# Patient Record
Sex: FEMALE | Race: WHITE | Employment: OTHER | URBAN - METROPOLITAN AREA
[De-identification: names, ages, dates, MRNs, and addresses within clinical notes are randomized per-mention and may not be internally consistent; named-entity substitution may affect disease eponyms.]

---

## 2019-02-26 ENCOUNTER — HOSPITAL ENCOUNTER (OUTPATIENT)
Facility: CLINIC | Age: 69
Discharge: HOME | End: 2019-02-26
Attending: COLON & RECTAL SURGERY | Admitting: COLON & RECTAL SURGERY
Payer: MEDICARE

## 2019-02-26 VITALS
DIASTOLIC BLOOD PRESSURE: 84 MMHG | HEART RATE: 63 BPM | OXYGEN SATURATION: 99 % | WEIGHT: 180 LBS | HEIGHT: 65 IN | BODY MASS INDEX: 29.99 KG/M2 | RESPIRATION RATE: 16 BRPM | SYSTOLIC BLOOD PRESSURE: 125 MMHG

## 2019-02-26 LAB — COLONOSCOPY: NORMAL

## 2019-02-26 PROCEDURE — 25000128 H RX IP 250 OP 636: Performed by: COLON & RECTAL SURGERY

## 2019-02-26 PROCEDURE — 88305 TISSUE EXAM BY PATHOLOGIST: CPT | Performed by: COLON & RECTAL SURGERY

## 2019-02-26 PROCEDURE — 99153 MOD SED SAME PHYS/QHP EA: CPT | Performed by: COLON & RECTAL SURGERY

## 2019-02-26 PROCEDURE — 45380 COLONOSCOPY AND BIOPSY: CPT | Mod: PT,XU | Performed by: COLON & RECTAL SURGERY

## 2019-02-26 PROCEDURE — G0500 MOD SEDAT ENDO SERVICE >5YRS: HCPCS | Performed by: COLON & RECTAL SURGERY

## 2019-02-26 PROCEDURE — 45385 COLONOSCOPY W/LESION REMOVAL: CPT | Performed by: COLON & RECTAL SURGERY

## 2019-02-26 PROCEDURE — 88305 TISSUE EXAM BY PATHOLOGIST: CPT | Mod: 26 | Performed by: COLON & RECTAL SURGERY

## 2019-02-26 RX ORDER — ONDANSETRON 2 MG/ML
4 INJECTION INTRAMUSCULAR; INTRAVENOUS
Status: DISCONTINUED | OUTPATIENT
Start: 2019-02-26 | End: 2019-02-26 | Stop reason: HOSPADM

## 2019-02-26 RX ORDER — ONDANSETRON 2 MG/ML
4 INJECTION INTRAMUSCULAR; INTRAVENOUS EVERY 6 HOURS PRN
Status: DISCONTINUED | OUTPATIENT
Start: 2019-02-26 | End: 2019-02-26 | Stop reason: HOSPADM

## 2019-02-26 RX ORDER — NALOXONE HYDROCHLORIDE 0.4 MG/ML
.1-.4 INJECTION, SOLUTION INTRAMUSCULAR; INTRAVENOUS; SUBCUTANEOUS
Status: DISCONTINUED | OUTPATIENT
Start: 2019-02-26 | End: 2019-02-26 | Stop reason: HOSPADM

## 2019-02-26 RX ORDER — FENTANYL CITRATE 50 UG/ML
INJECTION, SOLUTION INTRAMUSCULAR; INTRAVENOUS PRN
Status: DISCONTINUED | OUTPATIENT
Start: 2019-02-26 | End: 2019-02-26 | Stop reason: HOSPADM

## 2019-02-26 RX ORDER — FLUMAZENIL 0.1 MG/ML
0.2 INJECTION, SOLUTION INTRAVENOUS
Status: DISCONTINUED | OUTPATIENT
Start: 2019-02-26 | End: 2019-02-26 | Stop reason: HOSPADM

## 2019-02-26 RX ORDER — LIDOCAINE 40 MG/G
CREAM TOPICAL
Status: DISCONTINUED | OUTPATIENT
Start: 2019-02-26 | End: 2019-02-26 | Stop reason: HOSPADM

## 2019-02-26 RX ORDER — ONDANSETRON 4 MG/1
4 TABLET, ORALLY DISINTEGRATING ORAL EVERY 6 HOURS PRN
Status: DISCONTINUED | OUTPATIENT
Start: 2019-02-26 | End: 2019-02-26 | Stop reason: HOSPADM

## 2019-02-26 ASSESSMENT — MIFFLIN-ST. JEOR: SCORE: 1344.41

## 2019-02-26 NOTE — OP NOTE
See Provation Note In Chart    Shante Monterroso MD  [facility]  Office Phone # [phone]

## 2019-02-26 NOTE — H&P
Pre-Endoscopy History and Physical     Delisa Mulligan MRN# [id number]   YOB: 1950 Age: 68 year old     Date of Procedure: 2019  Primary care provider: Violeta Schumacher  Type of Endoscopy: colonoscopy  Reason for Procedure: family history  Type of Anesthesia Anticipated: Moderate Sedation    HPI:    Delisa is a 68 year old female who will be undergoing the above procedure.      A history and physical has been performed. The patient's medications and allergies have been reviewed. The risks and benefits of the procedure and the sedation options and risks were discussed with the patient.  All questions were answered and informed consent was obtained.      She denies a personal or family history of anesthesia complications or bleeding disorders.     Allergies   Allergen Reactions     Dust Mites Other (See Comments)     Sneezing and watery eyes        Prior to Admission Medications   Prescriptions Last Dose Informant Patient Reported? Taking?   AMLODIPINE BESYLATE 10 MG OR TABS 2019 at Unknown time  No Yes   Si TABLET DAILY   Alendronate Sodium (FOSAMAX PO) More than a month at Unknown time  Yes No   Sig: Take 70 mg by mouth once a week With full glass of water, do not lie down for 1 hr   NASACORT AQ 55 MCG/ACT NA AERS Past Week at Unknown time  No Yes   Sig: INHALE 2 SPRAYS IN EACH NOSTRIL ONE DAILY   Patient taking differently: inhale 1 spray in each nostril by intranasal route once daily   SIMVASTATIN 20 MG OR TABS 2019 at Unknown time  No Yes   Si TABLET AT BEDTIME   TRAMADOL HCL 50 MG OR TABS More than a month at Unknown time  Yes No   Sig: ONE TO TWO TABLETS EVERY 4 TO 6 HOURS AS NEEDED FOR PAIN   VITAMIN D, CHOLECALCIFEROL, PO Past Week at Unknown time  Yes Yes   Sig: Take 5,000 Units by mouth daily   ZYRTEC 10 MG OR TABS Past Week at Unknown time  No Yes   Sig: ONE TABLET DAILY   Zolpidem Tartrate (AMBIEN PO) 2019 at Unknown time  Yes Yes   Sig: Take 10 mg by mouth nightly as  needed for sleep   calcium-vitamin D (CALTRATE) 600-400 MG-UNIT per tablet Past Week at Unknown time  Yes Yes   Sig: Take 1 tablet by mouth 2 times daily   ciprofloxacin (CIPRO) 500 MG tablet More than a month at Unknown time  No No   Sig: Take 1 tablet (500 mg) by mouth 2 times daily   docusate sodium (COLACE) 100 MG capsule More than a month at Unknown time  No No   Sig: Take 1 capsule (100 mg) by mouth 2 times daily   ferrous gluconate (FERGON) 324 (38 FE) MG tablet More than a month at Unknown time  No No   Sig: Take 1 tablet (324 mg) by mouth daily   hydrocortisone 1 % lotion Past Week at Unknown time  Yes Yes   Sig: Apply topically 2 times daily Apply topically to affected area daily for itching   loteprednol (LOTEMAX) 0.5 % ophthalmic suspension Past Week at Unknown time  Yes Yes   Sig: Place 1 drop Into the left eye 4 times daily   oxyCODONE (OXYCONTIN) 20 MG 12 hr tablet More than a month at Unknown time  No No   Sig: Take 1 tablet (20 mg) by mouth every 12 hours   oxyCODONE (ROXICODONE) 5 MG immediate release tablet More than a month at Unknown time  No No   Sig: Take 1-2 tablets (5-10 mg) by mouth every 3 hours as needed for moderate to severe pain      Facility-Administered Medications: None       Patient Active Problem List   Diagnosis     Allergic state     Essential hypertension     Arthropathy     Pure hypercholesterolemia     S/P total knee replacement using cement     S/P total hip arthroplasty        Past Medical History:   Diagnosis Date     Acne rosacea      Allergic rhinitis      ALLERGY, UNSPECIFIED 12/11/2007     Arthritis     DJD KNEES     ARTHROPATHY NOS-UNSPEC 12/11/2007     Hemorrhoids, internal      HYPERTENSION NOS 12/11/2007     Hypertriglyceridaemia      PURE HYPERCHOLESTEROLEM 12/11/2007     Senile osteoporosis         Past Surgical History:   Procedure Laterality Date     ARTHROPLASTY HIP  1/07    Left     ARTHROPLASTY HIP  1/08    Right     ARTHROPLASTY KNEE  5/5/2014    Procedure:  "ARTHROPLASTY KNEE;  Surgeon: Silviano Botello MD;  Location: [facility] OR     ARTHROPLASTY KNEE Left 10/21/2014    Procedure: ARTHROPLASTY KNEE;  Surgeon: Silviano Botello MD;  Location: [facility] OR     BACK SURGERY  2002    lumbar spinal fusion     COLONOSCOPY  2012    Dr. Stout     COLONOSCOPY  02/26/2019    Dr. Monterroso [facility]     HERNIA REPAIR, INGUINAL RT/LT  1961    rt-as a child     INJECT STEROID (LOCATION)  5/5/2014    Procedure: INJECT STEROID (LOCATION);  Surgeon: Silviano Botello MD;  Location: [facility] OR     SURGICAL HISTORY OF -       shoulder surgery     SURGICAL HISTORY OF -   2002    back surgery     TONSILLECTOMY  1959       Social History     Tobacco Use     Smoking status: Never Smoker     Smokeless tobacco: Never Used   Substance Use Topics     Alcohol use: Yes     Comment: occ       Family History   Problem Relation Age of Onset     Colon Cancer Mother        REVIEW OF SYSTEMS:     5 point ROS negative except as noted above in HPI, including Gen., Resp., CV, GI &  system review.      PHYSICAL EXAM:   /82   Resp 16   Ht 1.638 m (5' 4.5\")   Wt 81.6 kg (180 lb)   SpO2 99%   BMI 30.42 kg/m   Estimated body mass index is 30.42 kg/m  as calculated from the following:    Height as of this encounter: 1.638 m (5' 4.5\").    Weight as of this encounter: 81.6 kg (180 lb).   GENERAL APPEARANCE: healthy and alert  MENTAL STATUS: alert  AIRWAY EXAM: Mallampatti Class II (visualization of the soft palate, fauces, and uvula)  RESP: lungs clear to auscultation - no rales, rhonchi or wheezes  CV: regular rates and rhythm      DIAGNOSTICS:    Not indicated      IMPRESSION   ASA Class 2 - Mild systemic disease        PLAN:       Plan for colonoscopy. We discussed the risks, benefits and alternatives and the patient wished to proceed.    The above has been forwarded to the consulting provider.      Signed Electronically by: Shante Monterroso MD  February 26, 2019    "

## 2019-02-26 NOTE — DISCHARGE INSTRUCTIONS
The patient has received a copy of the Provation  report the doctor has written and discharge instructions have been discussed with the patient and responsible adult.  All questions were addressed and answered prior to patient discharge.      Understanding Colon and Rectal Polyps     The colon has a smooth lining composed of millions of cells.     The colon (also called the large intestine) is a muscular tube that forms the last part of the digestive tract. It absorbs water and stores food waste. The colon is about 4 to 6 feet long. The rectum is the last 6 inches of the colon. The colon and rectum have a smooth lining composed of millions of cells. Changes in these cells can lead to growths in the colon that can become cancerous and should be removed.     When the Colon Lining Changes  Changes that occur in the cells that line the colon or rectum can lead to growths called polyps. Over a period of years, polyps can turn cancerous. Removing polyps early may prevent cancer from ever forming.      Polyps  Polyps are fleshy clumps of tissue that form on the lining of the colon or rectum. Small polyps are usually benign (not cancerous). However, over time, cells in a polyp can change and become cancerous. The larger a polyp grows, the more likely this is to happen. Also, certain types of polyps known as adenomatous polyps are considered premalignant. This means that they will almost always become cancerous if they re not removed.          Cancer  Almost all colorectal cancers start when polyp cells begin growing abnormally. As a cancerous tumor grows, it may involve more and more of the colon or rectum. In time, cancer can also grow beyond the colon or rectum and spread to nearby organs or to glands called lymph nodes. The cells can also travel to other parts of the body. This is known as metastasis. The earlier a cancerous tumor is removed, the better the chance of preventing its spread.        9562-8351 Kellie  [facility], [address] [place] [address]. All rights reserved. This information is not intended as a substitute for professional medical care. Always follow your healthcare professional's instructions.    DIVERTICULOSIS  You have diverticulosis. This means that small pouches have formed in the wall of the colon (large intestine). Most often, this problem causes no symptoms. But the pouches in the colon are at risk for becoming infected or inflamed. When this happens, the condition is called diverticulitis.  The doctor will talk with you about how to manage your condition. Diet changes may be all that are needed to help control diverticulosis and prevent progression to diverticulitis. If you develop diverticulitis, other treatments will likely be needed.  HOME CARE  Medications: You may be told to take fiber supplements daily. Fiber adds bulk to the stool so that it passes through the colon more easily. Stool softeners may be recommended. You may also be given medications for pain relief. Be sure to take all medications as directed.  General Care:    Eat unprocessed foods that are high in fiber. Whole grains, fruits, and vegetables are good choices.    Drink 6 to 8 glasses of water daily.    Watch for changes in your bowel movements. Tell the doctor if you notice any changes.    Begin an exercise program. Ask your doctor how to get started.    Get plenty of rest and sleep.  FOLLOW UP as advised by the doctor or our staff. Regular visits may be needed to check on your health. Be sure to keep all your appointments.  GET PROMPT MEDICAL ATTENTION if any of the following occurs:    Fever of 100.6 F (38 C) or higher, or as directed by your healthcare provider    Severe cramps in the lower left side of the abdomen or pain that is getting progressively worse.    Tenderness in the lower left side of the abdomen or worsening pain throughout the abdomen    Diarrhea or constipation that does not improve within 24  hours    Nausea and vomiting    Bleeding from the rectum      [id number] [facility], [address], [place] [address]. All rights reserved. This information is not intended as a substitute for professional medical care. Always follow your healthcare professional's instructions.    Eating a High-Fiber Diet  Fiber is what gives strength and structure to plants. Most grains, beans, vegetables, and fruits contain fiber. Foods rich in fiber are often low in calories and fat, and they fill you up more. They may also reduce your risks for certain health problems. To find out the amount of fiber in canned, packaged, or frozen foods, read the  Nutrition Facts  label. It tells you how much fiber is in a serving.      Types of Fiber and Their Benefits  There are two types of fiber: insoluble and soluble. They both aid digestion and help you maintain a healthy weight.  Insoluble fiber: This is found in whole grains, cereals, certain fruits and vegetables (such as apple skin, corn, and carrots). Insoluble fiber may prevent constipation and reduce the risk of certain types of cancer.   Soluble fiber: This type of fiber is in oats, beans, and certain fruits and vegetables (such as strawberries and peas). Soluble fiber can reduce cholesterol (which may help lower the risk of heart disease), and helps control blood sugar levels.  Look for High-Fiber Foods  Whole-grain breads and cereals: Try to eat 6-8 ounces a day. Include wheat and oat bran cereals, whole-wheat muffins or toast, and corn tortillas in your meals.  Fruits: Try to eat 2 cups a day. Apples, oranges, strawberries, pears, and bananas are good sources. (Note: Fruit juice is low in fiber.)  Vegetables: Try to eat 3 cups a day. Add asparagus, carrots, broccoli, peas, and corn to your meals.  Legumes (beans): One cup of cooked lentils gives you over 15 grams of fiber. Try navy beans, lentils, and chickpeas.  Seeds:  A small handful of seeds gives you about 3  grams of fiber. Try sunflower seeds.    Keep Track of Your Fiber  A healthy diet includes 31 grams of fiber a day if you have a 2,000-calorie diet. Keep track of how much fiber you eat. Start by reading food labels. Then eat a variety of foods high in fiber. Ask your doctor about supplemental fiber products.            [id number] Kell[employer] Stauffer, [address] [place] [address], [place] [address]. All rights reserved. This information is not intended as a substitute for professional medical care. Always follow your healthcare professional's instructions.

## 2019-02-27 LAB — COPATH REPORT: NORMAL

## 2019-11-03 ENCOUNTER — HEALTH MAINTENANCE LETTER (OUTPATIENT)
Age: 69
End: 2019-11-03

## 2020-02-10 ENCOUNTER — HEALTH MAINTENANCE LETTER (OUTPATIENT)
Age: 70
End: 2020-02-10

## 2020-11-16 ENCOUNTER — HEALTH MAINTENANCE LETTER (OUTPATIENT)
Age: 70
End: 2020-11-16

## 2021-04-03 ENCOUNTER — HEALTH MAINTENANCE LETTER (OUTPATIENT)
Age: 71
End: 2021-04-03

## 2021-05-27 ENCOUNTER — APPOINTMENT (OUTPATIENT)
Dept: RADIOLOGY | Facility: HOSPITAL | Age: 71
End: 2021-05-27
Payer: MEDICARE

## 2021-05-27 ENCOUNTER — HOSPITAL ENCOUNTER (EMERGENCY)
Facility: HOSPITAL | Age: 71
Discharge: HOME | End: 2021-05-27
Attending: PHYSICIAN ASSISTANT | Admitting: PHYSICIAN ASSISTANT
Payer: MEDICARE

## 2021-05-27 VITALS
HEIGHT: 63 IN | SYSTOLIC BLOOD PRESSURE: 136 MMHG | DIASTOLIC BLOOD PRESSURE: 78 MMHG | WEIGHT: 170 LBS | HEART RATE: 60 BPM | BODY MASS INDEX: 30.12 KG/M2 | OXYGEN SATURATION: 94 % | RESPIRATION RATE: 17 BRPM | TEMPERATURE: 99 F

## 2021-05-27 DIAGNOSIS — I48.91 ATRIAL FIBRILLATION (CMS/HCC): Primary | ICD-10-CM

## 2021-05-27 LAB
ALBUMIN SERPL-MCNC: 4.5 G/DL (ref 3.5–5.3)
ALP SERPL-CCNC: 73 U/L (ref 55–142)
ALT SERPL-CCNC: 28 U/L (ref 7–52)
ANION GAP SERPL CALC-SCNC: 12 MMOL/L (ref 3–11)
APTT PPP: 33.3 SECONDS (ref 25.1–36.5)
AST SERPL-CCNC: 29 U/L
BASE EXCESS BLDV CALC-SCNC: 3 MMOL/L (ref -2–2)
BASOPHILS # BLD AUTO: 0 10*3/UL
BASOPHILS NFR BLD AUTO: 1 % (ref 0–2)
BILIRUB SERPL-MCNC: 1.09 MG/DL (ref 0.2–1.4)
BNP SERPL-MCNC: 51 PG/ML (ref 0–100)
BUN SERPL-MCNC: 13 MG/DL (ref 7–25)
CALCIUM ALBUM COR SERPL-MCNC: 9.1 MG/DL (ref 8.6–10.3)
CALCIUM SERPL-MCNC: 9.5 MG/DL (ref 8.6–10.3)
CHLORIDE SERPL-SCNC: 101 MMOL/L (ref 98–107)
CO2 BLDV-SCNC: 26.2 MMOL/L (ref 19–24)
CO2 SERPL-SCNC: 25 MMOL/L (ref 21–32)
CREAT SERPL-MCNC: 0.77 MG/DL (ref 0.6–1.1)
CRP SERPL-MCNC: 3.1 MG/L
DELTA HIGH SENSITIVITY TROPONIN I, 1 HOUR: -0.2
EOSINOPHIL # BLD AUTO: 0.1 10*3/UL
EOSINOPHIL NFR BLD AUTO: 1 % (ref 0–3)
ERYTHROCYTE [DISTWIDTH] IN BLOOD BY AUTOMATED COUNT: 14.7 % (ref 11.5–14)
FIBRIN D-DIMER (NG/ML) IN PLATELET POOR PLASMA: 664 NG/ML FEU
GFR SERPL CREATININE-BSD FRML MDRD: 79 ML/MIN/1.73M*2
GLUCOSE SERPL-MCNC: 132 MG/DL (ref 70–105)
HCO3 BLDV-SCNC: 25.2 MMOL/L (ref 23–29)
HCT VFR BLD AUTO: 43.1 % (ref 34–45)
HGB BLD-MCNC: 14.9 G/DL (ref 11.5–15.5)
INR BLD: 1
LYMPHOCYTES # BLD AUTO: 0.6 10*3/UL
LYMPHOCYTES NFR BLD AUTO: 9 % (ref 11–47)
MAGNESIUM SERPL-MCNC: 1.7 MG/DL (ref 1.8–2.4)
MCH RBC QN AUTO: 29.3 PG (ref 28–33)
MCHC RBC AUTO-ENTMCNC: 34.5 G/DL (ref 32–36)
MCV RBC AUTO: 85.1 FL (ref 81–97)
MONOCYTES # BLD AUTO: 0.3 10*3/UL
MONOCYTES NFR BLD AUTO: 5 % (ref 3–11)
NEUTROPHILS # BLD AUTO: 5.7 10*3/UL
NEUTROPHILS NFR BLD AUTO: 85 % (ref 41–81)
PCO2 BLDA: ABNORMAL MM[HG]
PCO2 BLDV: 31.5 MMHG (ref 39–51)
PH BLDA: ABNORMAL [PH]
PH BLDV: 7.51 PH (ref 7.35–7.45)
PLATELET # BLD AUTO: 262 10*3/UL (ref 140–350)
PMV BLD AUTO: 7.5 FL (ref 6.9–10.8)
PO2 BLDV: 36.6 MMHG (ref 30–50)
POCT PO2 (T), VENOUS: ABNORMAL
POCT TEMPERATURE, VENOUS: ABNORMAL
POTASSIUM SERPL-SCNC: 3.5 MMOL/L (ref 3.5–5.1)
PROT SERPL-MCNC: 7 G/DL (ref 6–8.3)
PROTHROMBIN TIME: 11.9 SECONDS (ref 9.4–12.5)
RBC # BLD AUTO: 5.07 10*6/ΜL (ref 3.7–5.3)
SAO2 % BLDV: 77 % (ref 40–70)
SODIUM SERPL-SCNC: 138 MMOL/L (ref 135–145)
TROPONIN I SERPL-MCNC: 3.6 PG/ML
TROPONIN I SERPL-MCNC: 3.8 PG/ML
TSH SERPL DL<=0.05 MIU/L-ACNC: 1.87 UIU/ML (ref 0.34–4.82)
WBC # BLD AUTO: 6.7 10*3/UL (ref 4.5–10.5)

## 2021-05-27 PROCEDURE — 85610 PROTHROMBIN TIME: CPT | Performed by: PHYSICIAN ASSISTANT

## 2021-05-27 PROCEDURE — 84443 ASSAY THYROID STIM HORMONE: CPT | Performed by: PHYSICIAN ASSISTANT

## 2021-05-27 PROCEDURE — 82803 BLOOD GASES ANY COMBINATION: CPT | Performed by: PHYSICIAN ASSISTANT

## 2021-05-27 PROCEDURE — 71045 X-RAY EXAM CHEST 1 VIEW: CPT

## 2021-05-27 PROCEDURE — 6370000100 HC RX 637 (ALT 250 FOR IP): Performed by: PHYSICIAN ASSISTANT

## 2021-05-27 PROCEDURE — 85379 FIBRIN DEGRADATION QUANT: CPT | Performed by: PHYSICIAN ASSISTANT

## 2021-05-27 PROCEDURE — 96366 THER/PROPH/DIAG IV INF ADDON: CPT

## 2021-05-27 PROCEDURE — 96365 THER/PROPH/DIAG IV INF INIT: CPT

## 2021-05-27 PROCEDURE — 6360000200 HC RX 636 W HCPCS (ALT 250 FOR IP): Performed by: PHYSICIAN ASSISTANT

## 2021-05-27 PROCEDURE — 83735 ASSAY OF MAGNESIUM: CPT | Performed by: PHYSICIAN ASSISTANT

## 2021-05-27 PROCEDURE — 85025 COMPLETE CBC W/AUTO DIFF WBC: CPT | Performed by: PHYSICIAN ASSISTANT

## 2021-05-27 PROCEDURE — 84484 ASSAY OF TROPONIN QUANT: CPT | Performed by: PHYSICIAN ASSISTANT

## 2021-05-27 PROCEDURE — 36415 COLL VENOUS BLD VENIPUNCTURE: CPT | Performed by: PHYSICIAN ASSISTANT

## 2021-05-27 PROCEDURE — 93005 ELECTROCARDIOGRAM TRACING: CPT | Performed by: PHYSICIAN ASSISTANT

## 2021-05-27 PROCEDURE — 80053 COMPREHEN METABOLIC PANEL: CPT | Performed by: PHYSICIAN ASSISTANT

## 2021-05-27 PROCEDURE — 86140 C-REACTIVE PROTEIN: CPT | Performed by: PHYSICIAN ASSISTANT

## 2021-05-27 PROCEDURE — 99284 EMERGENCY DEPT VISIT MOD MDM: CPT

## 2021-05-27 PROCEDURE — 93010 ELECTROCARDIOGRAM REPORT: CPT | Performed by: INTERNAL MEDICINE

## 2021-05-27 PROCEDURE — 85730 THROMBOPLASTIN TIME PARTIAL: CPT | Performed by: PHYSICIAN ASSISTANT

## 2021-05-27 PROCEDURE — 83880 ASSAY OF NATRIURETIC PEPTIDE: CPT | Performed by: PHYSICIAN ASSISTANT

## 2021-05-27 PROCEDURE — 99284 EMERGENCY DEPT VISIT MOD MDM: CPT | Performed by: PHYSICIAN ASSISTANT

## 2021-05-27 RX ORDER — METOPROLOL SUCCINATE 25 MG/1
TABLET, EXTENDED RELEASE ORAL
Status: DISCONTINUED
Start: 2021-05-27 | End: 2021-05-27 | Stop reason: HOSPADM

## 2021-05-27 RX ORDER — METOPROLOL SUCCINATE 25 MG/1
25 TABLET, EXTENDED RELEASE ORAL ONCE
Status: COMPLETED | OUTPATIENT
Start: 2021-05-27 | End: 2021-05-27

## 2021-05-27 RX ORDER — ZOLPIDEM TARTRATE 5 MG/1
5 TABLET ORAL NIGHTLY PRN
COMMUNITY

## 2021-05-27 RX ORDER — CHOLECALCIFEROL (VITAMIN D3) 25 MCG
6000 TABLET ORAL DAILY
COMMUNITY

## 2021-05-27 RX ORDER — SIMVASTATIN 20 MG/1
20 TABLET, FILM COATED ORAL NIGHTLY
COMMUNITY

## 2021-05-27 RX ORDER — AMLODIPINE BESYLATE 10 MG/1
10 TABLET ORAL DAILY
COMMUNITY

## 2021-05-27 RX ORDER — DULOXETIN HYDROCHLORIDE 30 MG/1
40 CAPSULE, DELAYED RELEASE ORAL DAILY
COMMUNITY

## 2021-05-27 RX ORDER — METOPROLOL SUCCINATE 25 MG/1
25 TABLET, EXTENDED RELEASE ORAL DAILY
Qty: 30 TABLET | Refills: 0 | Status: SHIPPED | OUTPATIENT
Start: 2021-05-27 | End: 2021-06-26

## 2021-05-27 RX ORDER — MULTIVITAMIN
1 TABLET ORAL DAILY
COMMUNITY

## 2021-05-27 RX ORDER — MAGNESIUM SULFATE HEPTAHYDRATE 40 MG/ML
2 INJECTION, SOLUTION INTRAVENOUS ONCE
Status: COMPLETED | OUTPATIENT
Start: 2021-05-27 | End: 2021-05-27

## 2021-05-27 RX ORDER — NAPROXEN SODIUM 220 MG/1
162 TABLET, FILM COATED ORAL ONCE
Status: COMPLETED | OUTPATIENT
Start: 2021-05-27 | End: 2021-05-27

## 2021-05-27 RX ADMIN — METOPROLOL SUCCINATE 25 MG: 25 TABLET, EXTENDED RELEASE ORAL at 19:46

## 2021-05-27 RX ADMIN — NAPROXEN SODIUM 162 MG: 220 TABLET, FILM COATED ORAL at 19:05

## 2021-05-27 RX ADMIN — MAGNESIUM SULFATE HEPTAHYDRATE 2 G: 40 INJECTION, SOLUTION INTRAVENOUS at 19:37

## 2021-05-27 RX ADMIN — METOPROLOL SUCCINATE 25 MG: 25 TABLET, EXTENDED RELEASE ORAL at 19:45

## 2021-05-27 ASSESSMENT — ENCOUNTER SYMPTOMS
CONFUSION: 0
HEADACHES: 0
DIAPHORESIS: 0
NAUSEA: 1
STRIDOR: 0
BLOOD IN STOOL: 0
CHILLS: 1
MYALGIAS: 0
DECREASED CONCENTRATION: 0
PHOTOPHOBIA: 0
CONSTIPATION: 0
PALPITATIONS: 0
JOINT SWELLING: 0
CHEST TIGHTNESS: 0
VOMITING: 1
EYE PAIN: 0
NUMBNESS: 0
FEVER: 0
SPEECH DIFFICULTY: 0
SORE THROAT: 0
NECK STIFFNESS: 0
SHORTNESS OF BREATH: 1
BACK PAIN: 0
LIGHT-HEADEDNESS: 1
SINUS PRESSURE: 0
RHINORRHEA: 0
EYE DISCHARGE: 0
ARTHRALGIAS: 0
ABDOMINAL DISTENTION: 0
COLOR CHANGE: 0
DYSURIA: 0
FACIAL SWELLING: 0
COUGH: 1
ABDOMINAL PAIN: 1
SINUS PAIN: 0
TROUBLE SWALLOWING: 0
FLANK PAIN: 0
NERVOUS/ANXIOUS: 0
HALLUCINATIONS: 0
NECK PAIN: 0
TREMORS: 0
SEIZURES: 0
HEMATURIA: 0
AGITATION: 0
WOUND: 0
APNEA: 0
FATIGUE: 0
CHOKING: 0
FACIAL ASYMMETRY: 0
DIZZINESS: 0
WEAKNESS: 0
SLEEP DISTURBANCE: 0
DIARRHEA: 0
WHEEZING: 0

## 2021-05-27 ASSESSMENT — PAIN DESCRIPTION - DESCRIPTORS: DESCRIPTORS: SHARP;ACHING

## 2021-05-27 NOTE — ED NOTES
C/O chest pain on and off for a couple of month but getting worse, has worn the halter monitor. Started last night and thought she should be checked out. Reports she coughed all night. Pain is in left chest rates about a 5/10 at this time. Also c/O pain to zyphoid area.     Shahid Lopez RN  05/27/21 [id number]

## 2021-05-27 NOTE — ED PROVIDER NOTES
"    HPI:  Chief Complaint   Patient presents with   • Chest Pain       Patient is a 70 year old female presenting to the emergency room via private vehicle with complaints of chest pain. She reports intermittent chest pain for the past 2 months but states that the pain she feels today started last night. She describes it as a sharp sensation that has gradually been worsening. She rates her pain 5/10 in severity today and points to her sternum when describing the location. Patient denies any exacerbating or alleviating factors to her chest pain and states that it \"comes and goes\". She complains of associated shortness of breath with exertion, intermittent light headedness, nausea, vomiting 3 times today, epigastric abdominal pain, chills, and vision changes. She states that while she was driving here from [place] 3 days ago, her peripheral vision in her left eye became blurry but returned to normal after roughly 15 minutes. She denies any appetite changes, headache, speech difficulty, weakness, dizziness, fever, diarrhea, or diaphoresis. Patient denies any recent sick contacts and states that she has been vaccinated against COVID-19.     Patient is visiting from [place] with her . She reports that she had blood work, an EKG, chest x-ray and heart monitor for 2 weeks all completed within the last month due to her intermittent chest pain. She has not received these results yet. Patient reports a history of hypertension for which she takes Amlodipine 10 mg. She also takes Simvastatin 20 mg for hyperlipidemia. She reports that her mother and 2 of her brothers have histories of atrial fibrillation and have had previous myocardial infarctions.     History and examination completed by JORGE VernonS2 supervised by Nicki Irizarry PA-C.           HISTORY:  Past Medical History:   Diagnosis Date   • Hypercholesteremia    • Hypertension        Past Surgical History:   Procedure Laterality Date   • BACK SURGERY "     • JOINT REPLACEMENT         History reviewed. No pertinent family history.    Social History     Tobacco Use   • Smoking status: Never Smoker   • Smokeless tobacco: Never Used   Vaping Use   • Vaping Use: Never used   Substance Use Topics   • Alcohol use: Yes   • Drug use: Never         ROS:  Review of Systems   Constitutional: Positive for chills. Negative for diaphoresis, fatigue and fever.   HENT: Negative for congestion, dental problem, drooling, ear pain, facial swelling, nosebleeds, rhinorrhea, sinus pressure, sinus pain, sneezing, sore throat, tinnitus and trouble swallowing.    Eyes: Positive for visual disturbance (peripheral vision of left eye). Negative for photophobia, pain and discharge.   Respiratory: Positive for cough and shortness of breath. Negative for apnea, choking, chest tightness, wheezing and stridor.    Cardiovascular: Positive for chest pain. Negative for palpitations and leg swelling.   Gastrointestinal: Positive for abdominal pain, nausea and vomiting. Negative for abdominal distention, blood in stool, constipation and diarrhea.   Genitourinary: Negative for dysuria, flank pain, hematuria and urgency.   Musculoskeletal: Negative for arthralgias, back pain, gait problem, joint swelling, myalgias, neck pain and neck stiffness.   Skin: Negative for color change, pallor, rash and wound.   Neurological: Positive for light-headedness. Negative for dizziness, tremors, seizures, syncope, facial asymmetry, speech difficulty, weakness, numbness and headaches.   Psychiatric/Behavioral: Negative for agitation, confusion, decreased concentration, hallucinations and sleep disturbance. The patient is not nervous/anxious.        PE:  ED Triage Vitals   Temp Heart Rate Resp BP SpO2   05/27/21 1810 05/27/21 1810 05/27/21 1810 05/27/21 1810 05/27/21 1810   37.7 °C (99.8 °F) 74 18 102/87 93 %      Temp Source Heart Rate Source Patient Position BP Location FiO2 (%)   05/27/21 1810 -- 05/27/21 1845 -- --    Oral  Head of bed 30 degrees or higher         Physical Exam  Vitals and nursing note reviewed.   Constitutional:       General: She is not in acute distress.     Appearance: She is well-developed.   HENT:      Head: Normocephalic and atraumatic.      Right Ear: Tympanic membrane normal.      Left Ear: Tympanic membrane normal.      Nose: Nose normal. No congestion or rhinorrhea.      Mouth/Throat:      Mouth: Mucous membranes are moist.      Pharynx: Oropharynx is clear. No oropharyngeal exudate or posterior oropharyngeal erythema.   Eyes:      General: No scleral icterus.     Extraocular Movements: Extraocular movements intact.      Conjunctiva/sclera: Conjunctivae normal.      Pupils: Pupils are equal, round, and reactive to light.   Neck:      Vascular: No JVD.   Cardiovascular:      Rate and Rhythm: Tachycardia present. Rhythm irregular.      Pulses: Normal pulses.      Heart sounds: Normal heart sounds. No murmur heard.     Pulmonary:      Effort: Pulmonary effort is normal. No respiratory distress.      Breath sounds: Normal breath sounds. No stridor. No wheezing or rhonchi.   Chest:      Chest wall: No tenderness.   Abdominal:      General: Abdomen is flat. Bowel sounds are normal. There is no distension.      Palpations: Abdomen is soft. There is no mass.      Tenderness: There is abdominal tenderness (epigastric). There is no right CVA tenderness, left CVA tenderness, guarding or rebound.      Hernia: No hernia is present.   Musculoskeletal:         General: No swelling, tenderness, deformity or signs of injury. Normal range of motion.      Cervical back: Normal range of motion and neck supple.      Right lower leg: No edema.      Left lower leg: No edema.   Lymphadenopathy:      Cervical: No cervical adenopathy.   Skin:     General: Skin is warm and dry.      Capillary Refill: Capillary refill takes less than 2 seconds.      Coloration: Skin is not jaundiced.      Findings: No erythema.   Neurological:  "     General: No focal deficit present.      Mental Status: She is alert and oriented to person, place, and time.      Cranial Nerves: No cranial nerve deficit.      Sensory: No sensory deficit.      Motor: No weakness.      Coordination: Coordination normal.      Gait: Gait normal.   Psychiatric:         Mood and Affect: Mood normal.         Behavior: Behavior normal.         Thought Content: Thought content normal.         Judgment: Judgment normal.         ED LABS:  Labs Reviewed   COMPREHENSIVE METABOLIC PANEL - Abnormal       Result Value    Sodium 138      Potassium 3.5      Chloride 101      CO2 25      Anion Gap 12 (*)     BUN 13      Creatinine 0.77      Glucose 132 (*)     Calcium 9.5      AST 29      ALT (SGPT) 28      Alkaline Phosphatase 73      Total Protein 7.0      Albumin 4.5      Total Bilirubin 1.09      eGFR 79      Corrected Calcium 9.1      Narrative:     Estimated GFR calculated using the 2009 CKD-EPI creatinine equation.   D-DIMER, QUANTITATIVE - Abnormal    D-Dimer, Quant (ng/mL) 664 (*)     Narrative:     D-dimer values increase with age and this can make VTE exclusion of an older population difficult. To address this, The [employer], based on best available evidence and recent guidelines, recommends that clinicians use age-adjusted D-dimer thresholds in patients greater than 50 years of age with: a) a low probability of PE who do not meet all Pulmonary Embolism Rule Out Criteria, or b) in those with intermediate probability of PE. The formula for an age-adjusted D-dimer cut-off is \"ageX10 ng/mL\". For example, a 60 year old patient would have an age-adjusted cut-off of 600 ng/mL FEU and an 80 year old would be 800 ng/mL FEU.  D-dimer values < or =500 ng/mL FEU may be used in conjunction with clinical pretest probability to exclude deep vein thrombosis (DVT) and pulmonary embolism (PE).   CBC WITH AUTO DIFFERENTIAL - Abnormal    WBC 6.7      RBC 5.07      Hemoglobin 14.9  "     Hematocrit 43.1      MCV 85.1      MCH 29.3      MCHC 34.5      RDW 14.7 (*)     Platelets 262      MPV 7.5      Neutrophils% 85 (*)     Lymphocytes% 9 (*)     Monocytes% 5      Eosinophils% 1      Basophils% 1      ANC (auto diff) 5.70      Lymphocytes Absolute 0.60      Monocytes Absolute 0.30      Eosinophils Absolute 0.10      Basophils Absolute 0.00     MAGNESIUM - Abnormal    Magnesium 1.7 (*)    POCT VENOUS BLOOD GAS - Abnormal    pH, Venous 7.51 (*)     pCO2, Venous 31.5 (*)     pO2, Venous 36.6      pH (T), Venous        pCO2 (T), Venous        pO2 (T), Venous        HCO3, Venous 25.2      Base Excess, Venous 3.0 (*)     O2 Sat, Venous 77 (*)     tCO2 (calculated), Venous 26.2 (*)     Patient Temperature       PROTIME-INR - Normal    Protime 11.9      INR 1.0     PTT (ACTIVATED PARTIAL THROMBOPLASTIN TIME) - Normal    PTT 33.3     C-REACTIVE PROTEIN - Normal    CRP 3.1     TSH - Normal    TSH 1.872     B-TYPE NATRIURETIC PEPTIDE (BNP) - Normal    BNP 51     HIGH SENSITIVE TROPONIN I - Normal    hsTnI 0 Hour 3.8     HIGH SENSITIVE TROPONIN I, 1 HOUR - Normal    hsTnI 1 hr 3.6      Delta from 0 Hour -0.2     POCT BLOOD GAS    Narrative:     The following orders were created for panel order POCT Blood Gas Blood, Venous.  Procedure                               Abnormality         Status                     ---------                               -----------         ------                     POCT Venous blood gas Blo...[[id number]]  Abnormal            Final result                 Please view results for these tests on the individual orders.   HIGH SENSITIVE TROPONIN I PANEL (0HR, 1HR, 2HR)    Narrative:     The following orders were created for panel order HS Troponin I Panel (0HR, 1HR, 2HR) Blood, Venous.  Procedure                               Abnormality         Status                     ---------                               -----------         ------                     HS Troponin I[[id number]]                  Normal              Final result               1HR High Sensitive Trop I[[id number]]     Normal              Final result               2HR High Sensitive Tropon...[[id number]]                                                   Please view results for these tests on the individual orders.   URINALYSIS WITH MICROSCOPIC, REFLEX CULTURE    Narrative:     The following orders were created for panel order Urinalysis w/microscopic, reflex culture Urine, Clean Catch.  Procedure                               Abnormality         Status                     ---------                               -----------         ------                     Urinalysis, Dip (part 1 o...[[id number]]                                                 Urinalysis, Micro (part 2...[[id number]]                                                   Please view results for these tests on the individual orders.   URINALYSIS DIPSTICK REFLEX TO CULTURE FOR USE WITH MICROSCOPIC PANEL   URINALYSIS MICROSCOPIC, REFLEX CULTURE   HIGH SENSITIVE TROPONIN I, 2 HOUR         ED IMAGES:  XR chest portable 1 view   Final Result   IMPRESSION:   No acute cardiopulmonary abnormality.          ED PROCEDURES:  Procedures    ED COURSE:  ED Course as of May 27 2143   Thu May 27, 2021   1831 While hooking the patient up to the cardiac monitor, it was noted that she was in an atrial fibrillation with RVR. The patient was talking and gestured with her arms in the air when she spontaneously converted back to a normal sinus rhythm.     [CC]   1832 pH, Venous(!): 7.51 [CC]   1832 pCO2, Venous(!): 31.5 [CC]   1832 Base Excess, Venous(!): 3.0 [CC]   1832 O2 Sat, Venous(!): 77 [CC]   1832 tCO2 (calculated), Venous(!): 26.2 [CC]   1832 RDW(!): 14.7 [CC]   1832 Neutrophils%(!): 85 [CC]   1832 Lymphocytes%(!): 9 [CC]   1842 Anion Gap(!): 12 [CC]   1842 Glucose(!): 132 [CC]   1842 Magnesium(!): 1.7 [CC]   1852 hsTnI 0 Hour: 3.8 [CC]   1852 D-Dimer, Quant (ng/mL)(!): 664 [CC]   1934 Spoke  with on call physician Dr. Pollard at 1929 who recommended administration of magnesium 2 g IV while in the emergency room and to start the patient on Metoprolol succinate 25 mg first dose to be given here in the emergency room for rate control. He also recommended starting OTC magnesium oxide 400 mg and Aspirin 81 mg daily.     [CC]   3057 Attached Instructions    Atrial Fibrillation Easy-to-Read (English)    DISCHARGE INSTRUCTIONS:  While in the emergency room, your heart rhythm was in atrial fibrillation. You spontaneously converted back to a normal sinus rhythm. Rhythm strips showed the atrial fibrillation prior to conversion but EKG showed sinus rhythm. Chest x-ray was normal. Labs were unremarkable except for a slightly low magnesium level.     You were started on Metoprolol 25 mg once daily to keep your heart in a normal rhythm and prevent reoccurrence of atrial fibrillation. You were given one here in the emergency room along with another tablet to take tomorrow. Take your prescription to the nearest pharmacy tomorrow.     Take aspirin 81 mg once daily, an anticoagulant to help lower your risk for clots or a stroke as atrial fibrillation can put you at an increased risk for clots.     Take over-the-counter Magnesium oxide 400 mg daily because low magnesium levels can cause irregular heart rhythms.     Follow up with your primary care provider when you return to [place]. Inform them of this emergency room visit and atrial fibrillation. If your chest pain returns or any of your symptoms worsen, please call 911 or go to the nearest emergency department.     [CC]      ED Course User Index  [CC] Nicki Irizarry PA-C          Sepsis Quality Bundle     MDM:  MDM  Number of Diagnoses or Management Options  Atrial fibrillation (CMS/HCC) (HCC): new and requires workup     Amount and/or Complexity of Data Reviewed  Clinical lab tests: ordered and reviewed  Tests in the radiology section of CPT®: ordered and  reviewed  Independent visualization of images, tracings, or specimens: yes (EKG interpretation: Sinus rhythm, rate 74. Normal P axis. No ST elevation or depression. No T wave inversion. No ectopic beats. )    Risk of Complications, Morbidity, and/or Mortality  Presenting problems: high  General comments: The patient is a 70 year old female who comes to the emergency room with concerns of chest pain, shortness of breath, nausea and vomiting.  When she arrived, she was found to be in atrial fibrillation with RVR.  Early in her stay she spontaneously converted to a NSR and remained in the rhythm the remainder of her time in the emergency room.  After converting, her symptoms completely resolved and she was no longer having chest pain and her nausea also resolved.  Consulted with Dr. Pollard, and he recommended that the patient be given Mag Sulfate 2 gm in the emergency room and to continue oral supplementation daily.  He also recommended that she be started on Metoprolol Succinate 25 mg once daily for control of the A-fib.  She is to also take a once daily Aspirin 81 mg.  She is to follow-up with her primary care provider when she return to [place] once she returns.  She was stable/improved upon discharge, discharged to home with written and verbal instructions.  She acknowledged understanding and all questions were answered.    Patient Progress  Patient progress: stable      Final diagnoses:   [I48.91] Atrial fibrillation (CMS/[facility]) ([facility])        Nicki Irizarry PA-C  05/27/21 2201       Nicki Irizarry PA-C  06/01/21 0010     Detail Level: Detailed

## 2021-05-28 DIAGNOSIS — I48.91 ATRIAL FIBRILLATION (CMS/HCC): ICD-10-CM

## 2021-05-28 RX ORDER — METOPROLOL SUCCINATE 25 MG/1
TABLET, EXTENDED RELEASE ORAL
Qty: 90 TABLET | OUTPATIENT
Start: 2021-05-28

## 2021-05-28 NOTE — DISCHARGE INSTRUCTIONS
While in the emergency room, your heart rhythm was in atrial fibrillation. You spontaneously converted back to a normal sinus rhythm. Rhythm strips showed the atrial fibrillation prior to conversion but EKG showed sinus rhythm. Chest x-ray was normal. Labs were unremarkable except for a slightly low magnesium level.     You were started on Metoprolol 25 mg once daily to keep your heart in a normal rhythm and prevent reoccurrence of atrial fibrillation. You were given one here in the emergency room along with another tablet to take tomorrow. Take your prescription to the nearest pharmacy tomorrow.     Take aspirin 81 mg once daily, an anticoagulant to help lower your risk for clots or a stroke as atrial fibrillation can put you at an increased risk for clots.     Take over-the-counter Magnesium oxide 400 mg daily because low magnesium levels can cause irregular heart rhythms.     Follow up with your primary care provider when you return to [place]. Inform them of this emergency room visit and atrial fibrillation. If your chest pain returns or any of your symptoms worsen, please call 911 or go to the nearest emergency department.

## 2021-06-20 DIAGNOSIS — I48.91 ATRIAL FIBRILLATION (CMS/HCC): ICD-10-CM

## 2021-06-21 RX ORDER — METOPROLOL SUCCINATE 25 MG/1
TABLET, EXTENDED RELEASE ORAL
Qty: 30 TABLET | Refills: 0 | OUTPATIENT
Start: 2021-06-21

## 2021-06-21 NOTE — TELEPHONE ENCOUNTER
This patient was seen in the ED and prescribed this medication. She has no PCP on file. She needs to establish with someone prior to refill. Please contact patient for an appointment. May be able to short fill after she makes an appointment.

## 2021-06-22 NOTE — TELEPHONE ENCOUNTER
Left message for  Harvey that we will not be able to refill Raquel's medications as she will need to establish with a provider in [place]

## 2021-09-18 ENCOUNTER — HEALTH MAINTENANCE LETTER (OUTPATIENT)
Age: 71
End: 2021-09-18

## 2021-11-13 ENCOUNTER — HEALTH MAINTENANCE LETTER (OUTPATIENT)
Age: 71
End: 2021-11-13

## 2022-04-30 ENCOUNTER — HEALTH MAINTENANCE LETTER (OUTPATIENT)
Age: 72
End: 2022-04-30

## 2022-09-11 ENCOUNTER — HOSPITAL ENCOUNTER (EMERGENCY)
Facility: CLINIC | Age: 72
Discharge: STILL ADMITTED OR ADMINISTRATIVE | End: 2022-09-11
Payer: MEDICARE

## 2022-11-19 ENCOUNTER — HEALTH MAINTENANCE LETTER (OUTPATIENT)
Age: 72
End: 2022-11-19

## 2023-11-19 ENCOUNTER — HEALTH MAINTENANCE LETTER (OUTPATIENT)
Age: 73
End: 2023-11-19

## (undated) DEVICE — ENDO SNARE POLYPECTOMY OVAL 15MM LOOP SD-240U-15

## (undated) DEVICE — ENDO FORCEP ENDOJAW BIOPSY 2.8MMX230CM FB-220U

## (undated) DEVICE — KIT ENDO TURNOVER/PROCEDURE W/CLEAN A SCOPE LINERS 103888

## (undated) DEVICE — ENDO TRAP POLYP QUICK CATCH 710201

## (undated) RX ORDER — FENTANYL CITRATE 50 UG/ML
INJECTION, SOLUTION INTRAMUSCULAR; INTRAVENOUS
Status: DISPENSED
Start: 2019-02-26